# Patient Record
Sex: MALE | Race: WHITE
[De-identification: names, ages, dates, MRNs, and addresses within clinical notes are randomized per-mention and may not be internally consistent; named-entity substitution may affect disease eponyms.]

---

## 2018-06-02 ENCOUNTER — HOSPITAL ENCOUNTER (EMERGENCY)
Dept: HOSPITAL 50 - VM.ED | Age: 68
Discharge: HOME | End: 2018-06-02
Payer: COMMERCIAL

## 2018-06-02 DIAGNOSIS — S13.4XXA: Primary | ICD-10-CM

## 2018-06-02 DIAGNOSIS — S00.81XA: ICD-10-CM
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
## 2018-06-02 NOTE — EDM.PDOC
ED HPI GENERAL MEDICAL PROBLEM





- General


Chief Complaint: General


Stated Complaint: LACERATION


Time Seen by Provider: 06/02/18 16:25


Source of Information: Reports: Patient


History Limitations: Reports: No Limitations





- History of Present Illness


INITIAL COMMENTS - FREE TEXT/NARRATIVE: 





Patient comes in to the emergency department with complaints of forehead 

abrasion and right neck pain. [place] 

down on him causing abrasion across his Mid forehead region and right parietal 

region of the head. He also states that he has muscle stiffness on his right 

neck. It hurts when he moves his arm or turn his neck. He denies any bony/

vertebral pain. He denies losing consciousness, dizziness, lightheadedness, 

blurred vision, or nausea and vomiting.


Onset: Sudden


Quality: Reports: Throbbing


Severity: Mild


Improves with: Reports: Immobilization


Worsens with: Reports: Movement


Context: Reports: Activity





ED ROS GENERAL





- Review of Systems


Review Of Systems: See Below


Constitutional: Reports: No Symptoms


HEENT: Reports: No Symptoms


Respiratory: Reports: No Symptoms


Cardiovascular: Reports: No Symptoms


Endocrine: Reports: No Symptoms


Musculoskeletal: Reports: Muscle Pain (right neck ), Muscle Stiffness (right 

neck )


Skin: Reports: No Symptoms


Neurological: Reports: No Symptoms


Psychiatric: Reports: No Symptoms


Hematologic/Lymphatic: Reports: No Symptoms





ED EXAM, GENERAL





- Physical Exam


Exam: See Below


Exam Limited By: No Limitations


General Appearance: Alert, WD/WN, No Apparent Distress


Ears: Normal External Exam, Normal Canal


Nose: Normal Inspection, Normal Mucosa


Throat/Mouth: Normal Inspection, Normal Lips


Head: Atraumatic, Other (abrasion right temporal region 5 inch in lenght. no 

swelling, redness, bleeding or warmth noted.  Right parietal abrasion- no 

swelling redness and warmth noted)


Respiratory/Chest: No Respiratory Distress, Lungs Clear, No Accessory Muscle Use


Cardiovascular: Normal Peripheral Pulses, Regular Rate, Rhythm


Back Exam: Normal Inspection, Full Range of Motion


Extremities: Normal Inspection, Normal Range of Motion


Neurological: Alert, Oriented, Normal Gait


Psychiatric: Normal Affect, Normal Mood


Skin Exam: Warm, Dry, Intact, Normal Color, Wound/Incision (right neck. ROM 

intact, CMS intact. No bruising, redness, warmth, or swelling noted. tenderness 

upon palpation )





Departure





- Departure


Time of Disposition: 17:05


Disposition: Home, Self-Care 01


Condition: Good


Clinical Impression: 


 Abrasion, Stiff muscles








- Discharge Information


Instructions:  Abrasion, Cervical Strain and Sprain Rehab-[facility]


Forms:  ED Department Discharge


Additional Instructions: 


1. rest


2. Keep the forehead abrasion dry and clean


3. Take ibuprofen and Tylenol as needed for pain control


4. Use ice and heat at least 3 times a day alternating to help relieve 

discomfort and pain in the right side of the neck.


5. Follow up with PCP in week if not better


6. Can see the chiropractor or massage therapist to help relieve the discomfort


7. Activity and diet as tolerated








- Problem List Review


Problem List Initiated/Reviewed/Updated: Yes





- Assessment/Plan


Assessment:: 





1. abrasion to forehead


2. right neck pain 


Plan: 





1. Radiographic imaging is not advised at this time. The patient has good range 

of motion and CMS intact, no loss of consciousness, or fall, denies any bony 

spine discomfort. 


2. Education given to the pt regarding follow up, diet, OTC pain medication, 

and ice and heat application. 


3. Pt was also advised to follow up with PCP within a week if neck is still 

sore.

## 2020-02-01 NOTE — EDM.PDOC
ED HPI GENERAL MEDICAL PROBLEM





- General


Chief Complaint: Gastrointestinal Problem


Stated Complaint: Large emesis of blood this am.


Time Seen by Provider: 02/01/20 07:20


Source of Information: Reports: Patient, Family


History Limitations: Reports: No Limitations





- History of Present Illness


INITIAL COMMENTS - FREE TEXT/NARRATIVE: 





Patient states that he vomited about 1 cup full of black tarry looking blood 

about 2 hours ago at first it felt like he had to start coughing then it was a 

nausea and vomiting episode.  States it only happened once he had no symptoms 

prior and felt fine and feels fine now.  He did state he had a dark tarry stool 

last night and everything prior to that has been fine.





He had 2 stents placed at [facility] on 2 January one was a questionable aortic 

stent but the patient is unsure.  He was placed on Brilinta and has been on it 

ever since with no issues.  He has a follow-up with GI on 6 February for a 

upper and lower but he does not know why.





He denies any other complaints states he has been eating and drinking and doing 

well with no issues he has no known GI bleeds in the past


Duration: Hour(s):


Quality: Reports: Other (No pain whatsoever)


Associated Symptoms: Reports: Nausea/Vomiting





- Related Data


 Allergies











Allergy/AdvReac Type Severity Reaction Status Date / Time


 


dog dander Allergy  Sneezing Verified 02/01/20 07:01


 


lanolin alcohols Allergy  Rash Verified 02/01/20 07:01


 


wool Allergy  Itching Verified 02/01/20 07:01


 


onions Allergy  Swelling Uncoded 02/01/20 07:01











Home Meds: 


 Home Meds





Aspirin [Halfprin] 81 mg PO BID 01/21/20 [History]


Gabapentin [Neurontin] 100 mg PO BID 01/21/20 [History]


Multivitamin-Min/Iron/FA/Vit K [Multi-Day Plus Minerals Tablet] 1 each PO DAILY 

01/21/20 [History]


Nitroglycerin [Nitrostat] 0.4 mg SL ASDIRECTED PRN 01/21/20 [History]


Ticagrelor [Brilinta] 90 mg PO BID 01/21/20 [History]


Triamcinolone Acetonide [Triamcinolone Acetonide 0.5%] 15 gm .XX ASDIRECTED PRN 

01/21/20 [History]


atorvaSTATin [Lipitor] 20 mg PO BEDTIME 01/21/20 [History]











Past Medical History


Cardiovascular History: Reports: CAD, High Cholesterol, Hypertension, Stents





- Past Surgical History


HEENT Surgical History: Reports: Adenoidectomy, Tonsillectomy


Cardiovascular Surgical History: Reports: Coronary Artery Stent


Other Cardiovascular Surgeries/Procedures: 1/2/20 2 stents





ED ROS GENERAL





- Review of Systems


Review Of Systems: See Below


Constitutional: Reports: No Symptoms


HEENT: Reports: No Symptoms


Respiratory: Reports: No Symptoms


Cardiovascular: Reports: No Symptoms


Endocrine: Reports: No Symptoms


GI/Abdominal: Reports: Black Stool, Melena, Nausea, Vomiting.  Denies: 

Abdominal Pain, Anorexia, Bloody Stool, Constipation, Diarrhea, Decreased 

Appetite, Difficulty Swallowing, Distension, Flatus, Hematemesis, Hematochezia, 

Stool Incontinence


: Reports: No Symptoms


Musculoskeletal: Reports: No Symptoms


Skin: Reports: No Symptoms


Neurological: Reports: No Symptoms.  Denies: Dizziness, Syncope, Weakness


Psychiatric: Reports: No Symptoms


Hematologic/Lymphatic: Reports: No Symptoms.  Denies: Anemia, Easy Bleeding, 

Easy Bruising


Immunologic: Reports: No Symptoms





ED EXAM, GI/ABD





- Physical Exam


Exam: See Below


Exam Limited By: No Limitations


General Appearance: Alert, WD/WN, No Apparent Distress


Eyes: Bilateral: Normal Appearance, EOMI (PERRLA)


Ears: Normal External Exam, Hearing Grossly Normal


Nose: Normal Inspection, No Blood


Throat/Mouth: Normal Inspection, Normal Lips, Normal Teeth, Normal Gums, Normal 

Oropharynx, Normal Voice, No Airway Compromise


Neck: Normal Inspection, Supple, Non-Tender, Full Range of Motion


Respiratory/Chest: No Respiratory Distress, Lungs Clear, Normal Breath Sounds, 

No Accessory Muscle Use, Chest Non-Tender


Cardiovascular: Normal Peripheral Pulses, Regular Rate, Rhythm, No Edema, No 

Gallop, No JVD, No Murmur, No Rub, Other (Mild tachycardia at 104)


GI/Abdominal Exam: Normal Bowel Sounds, Soft, Non-Tender, No Organomegaly, No 

Distention, No Abnormal Bruit, No Mass.  No: Guarding, Rigid, Rebound, Tender, 

Abnormal Bowel Sounds


Back Exam: Normal Inspection, Full Range of Motion


Extremities: Normal Inspection, Normal Range of Motion, Non-Tender, No Pedal 

Edema, Normal Capillary Refill


Neurological: Alert, Oriented, CN II-XII Intact, Normal Cognition, Normal Gait, 

Normal Reflexes, No Motor/Sensory Deficits


Psychiatric: Normal Affect, Normal Mood


Skin Exam: Warm, Dry, Intact, Normal Color, No Rash





Course





- Vital Signs


Text/Narrative:: 





CBC CMP coags 500 mL normal saline bolus secondary to mild tachycardia





he has no signs or symptoms of being hemodynamically unstable





H&H 12.7/38.5 platelet 304   BUN 41 creatinine 1.1





Coags within normal limits





Labs are reviewed from 4 January 2020 patient had a stable H&H then 12.5/37.6 

his BUN and creatinine was 15/1








Spoke with GI Dr. Nina he is willing to consult on admission for the patient 

and prep for EGD/colonoscopy he agrees with the patient is hemodynamically 

stable wishes to watch him over the weekend





Spoke with Dr. Loredo [facility] willing to accept transfer and coordinate for 

hospitalist care





Patient is agreeable with transfer and is okay with diagnosis





Last Recorded V/S: 


 Last Vital Signs











Temp  36.5 C   02/01/20 06:38


 


Pulse  108 H  02/01/20 06:38


 


Resp  16   02/01/20 06:38


 


BP  145/90 H  02/01/20 06:38


 


Pulse Ox  97   02/01/20 06:38














- Orders/Labs/Meds


Labs: 


 Laboratory Tests











  02/01/20 02/01/20 02/01/20 Range/Units





  06:57 06:57 06:57 


 


WBC  7.9    (4.0-10.0)  x10^3/uL


 


RBC  4.59    (4.5-6.0)  x10^6/uL


 


Hgb  12.7 L    (14.0-18.0)  g/dL


 


Hct  38.5 L    (40.0-52.0)  %


 


MCV  83.9    (78.0-93.0)  fL


 


MCH  27.7    (26.0-32.0)  pg


 


MCHC  33.0    (32.0-36.0)  g/dL


 


RDW Coeff of Jossy  14.5    (10.0-15.0)  %


 


Plt Count  304    (130-400)  x10^3/uL


 


Neut % (Auto)  56.3    (50.0-80.0)  %


 


Lymph % (Auto)  23.0 L    (25.0-50.0)  %


 


Mono % (Auto)  13.3 H    (2.0-11.0)  %


 


Eos % (Auto)  6.8 H    (0.0-4.0)  %


 


Baso % (Auto)  0.6    (0.2-1.2)  %


 


PT   10.7   (10.0-12.8)  SEC


 


INR   0.9 L   (2.0-3.5)  


 


Sodium    143  (136-145)  mmol/L


 


Potassium    4.2  (3.5-5.1)  mmol/L


 


Chloride    105  ()  mmol/L


 


Carbon Dioxide    24  (21-32)  mmol/L


 


Anion Gap    18.2  (10-20)  mmol/L


 


BUN    41 H  (7-18)  mg/dL


 


Creatinine    1.1  (0.70-1.30)  mg/dL


 


Est Cr Clr Drug Dosing    63.38  mL/min


 


Estimated GFR (MDRD)    > 60  


 


Glucose    96  ()  mg/dL


 


Calcium    8.8  (8.5-10.1)  mg/dL


 


Corrected Calcium    9.28  (8.5-10.1)  mg/dL


 


Total Bilirubin    1.4 H  (0.2-1.0)  mg/dL


 


AST    35  (15-37)  U/L


 


ALT    50  (16-63)  U/L


 


Alkaline Phosphatase    77  ()  U/L


 


Total Protein    7.0  (6.4-8.2)  g/dL


 


Albumin    3.4  (3.4-5.0)  g/dL


 


Globulin    3.6  


 


Albumin/Globulin Ratio    0.94  











Meds: 


Medications














Discontinued Medications














Generic Name Dose Route Start Last Admin





  Trade Name Freq  PRN Reason Stop Dose Admin


 


Sodium Chloride  1,000 mls @ 999 mls/hr  02/01/20 07:05  02/01/20 07:10





  Normal Saline  IV  02/01/20 08:05  999 mls/hr





  ONETIME ONE   Administration





     





     





     





     














Departure





- Departure


Time of Disposition: 08:40


Disposition: DC/Tfer to Acute Hospital 02


Condition: Good


Clinical Impression: 


 GI bleed








- Discharge Information


*PRESCRIPTION DRUG MONITORING PROGRAM REVIEWED*: No


*COPY OF PRESCRIPTION DRUG MONITORING REPORT IN PATIENT SARA: No


Referrals: 


Dannielle Alatorre NP [Primary Care Provider] - 


Forms:  ED Department Discharge





Sepsis Event Note





- Evaluation


Sepsis Screening Result: No Definite Risk





- Focused Exam


Vital Signs: 


 Vital Signs











  Temp Pulse Resp BP Pulse Ox


 


 02/01/20 06:38  36.5 C  108 H  16  145/90 H  97











Date Exam was Performed: 02/01/20


Time Exam was Performed: 08:42





- Problem List & Annotations


(1) GI bleed


SNOMED Code(s): 67090106


   Code(s): K92.2 - GASTROINTESTINAL HEMORRHAGE, UNSPECIFIED   Status: Acute   

Current Visit: Yes